# Patient Record
Sex: FEMALE | Race: WHITE | NOT HISPANIC OR LATINO | Employment: OTHER | URBAN - METROPOLITAN AREA
[De-identification: names, ages, dates, MRNs, and addresses within clinical notes are randomized per-mention and may not be internally consistent; named-entity substitution may affect disease eponyms.]

---

## 2020-01-24 ENCOUNTER — HOSPITAL ENCOUNTER (EMERGENCY)
Facility: HOSPITAL | Age: 55
Discharge: HOME | End: 2020-01-24
Attending: EMERGENCY MEDICINE
Payer: COMMERCIAL

## 2020-01-24 ENCOUNTER — APPOINTMENT (EMERGENCY)
Dept: RADIOLOGY | Facility: HOSPITAL | Age: 55
End: 2020-01-24
Attending: EMERGENCY MEDICINE
Payer: COMMERCIAL

## 2020-01-24 VITALS
BODY MASS INDEX: 25.27 KG/M2 | SYSTOLIC BLOOD PRESSURE: 128 MMHG | TEMPERATURE: 98.5 F | WEIGHT: 148 LBS | HEART RATE: 82 BPM | OXYGEN SATURATION: 100 % | RESPIRATION RATE: 16 BRPM | DIASTOLIC BLOOD PRESSURE: 73 MMHG | HEIGHT: 64 IN

## 2020-01-24 DIAGNOSIS — R51.9 NONINTRACTABLE HEADACHE, UNSPECIFIED CHRONICITY PATTERN, UNSPECIFIED HEADACHE TYPE: Primary | ICD-10-CM

## 2020-01-24 DIAGNOSIS — E86.0 DEHYDRATION: ICD-10-CM

## 2020-01-24 DIAGNOSIS — R42 DIZZINESS: ICD-10-CM

## 2020-01-24 LAB
ANION GAP SERPL CALC-SCNC: 8 MEQ/L (ref 3–15)
ATRIAL RATE: 83
BASOPHILS # BLD: 0.05 K/UL (ref 0.01–0.1)
BASOPHILS NFR BLD: 0.8 %
BUN SERPL-MCNC: 16 MG/DL (ref 8–20)
CALCIUM SERPL-MCNC: 8.5 MG/DL (ref 8.9–10.3)
CHLORIDE SERPL-SCNC: 107 MEQ/L (ref 98–109)
CO2 SERPL-SCNC: 21 MEQ/L (ref 22–32)
CREAT SERPL-MCNC: 0.8 MG/DL
DIFFERENTIAL METHOD BLD: NORMAL
EOSINOPHIL # BLD: 0.11 K/UL (ref 0.04–0.36)
EOSINOPHIL NFR BLD: 1.7 %
ERYTHROCYTE [DISTWIDTH] IN BLOOD BY AUTOMATED COUNT: 12.7 % (ref 11.7–14.4)
GFR SERPL CREATININE-BSD FRML MDRD: >60 ML/MIN/1.73M*2
GLUCOSE SERPL-MCNC: 128 MG/DL (ref 70–99)
HCT VFR BLDCO AUTO: 43.4 %
HGB BLD-MCNC: 14.4 G/DL (ref 11.8–15.7)
IMM GRANULOCYTES # BLD AUTO: 0.03 K/UL (ref 0–0.08)
IMM GRANULOCYTES NFR BLD AUTO: 0.5 %
LYMPHOCYTES # BLD: 2.74 K/UL (ref 1.2–3.5)
LYMPHOCYTES NFR BLD: 42.1 %
MCH RBC QN AUTO: 28.1 PG (ref 28–33.2)
MCHC RBC AUTO-ENTMCNC: 33.2 G/DL (ref 32.2–35.5)
MCV RBC AUTO: 84.8 FL (ref 83–98)
MONOCYTES # BLD: 0.49 K/UL (ref 0.28–0.8)
MONOCYTES NFR BLD: 7.5 %
NEUTROPHILS # BLD: 3.09 K/UL (ref 1.7–7)
NEUTS SEG NFR BLD: 47.4 %
NRBC BLD-RTO: 0 %
P AXIS: 69
PDW BLD AUTO: 10.3 FL (ref 9.4–12.3)
PLATELET # BLD AUTO: 278 K/UL
POTASSIUM SERPL-SCNC: 5.9 MEQ/L (ref 3.6–5.1)
PR INTERVAL: 146
QRS DURATION: 70
QT INTERVAL: 378
QTC CALCULATION(BAZETT): 444
R AXIS: 37
RBC # BLD AUTO: 5.12 M/UL (ref 3.93–5.22)
SODIUM SERPL-SCNC: 136 MEQ/L (ref 136–144)
T WAVE AXIS: 48
TROPONIN I SERPL-MCNC: <0.02 NG/ML
VENTRICULAR RATE: 83
WBC # BLD AUTO: 6.51 K/UL

## 2020-01-24 PROCEDURE — 25800000 HC PHARMACY IV SOLUTIONS: Performed by: EMERGENCY MEDICINE

## 2020-01-24 PROCEDURE — 99284 EMERGENCY DEPT VISIT MOD MDM: CPT | Mod: 25

## 2020-01-24 PROCEDURE — 96360 HYDRATION IV INFUSION INIT: CPT

## 2020-01-24 PROCEDURE — 85025 COMPLETE CBC W/AUTO DIFF WBC: CPT

## 2020-01-24 PROCEDURE — 3E0337Z INTRODUCTION OF ELECTROLYTIC AND WATER BALANCE SUBSTANCE INTO PERIPHERAL VEIN, PERCUTANEOUS APPROACH: ICD-10-PCS | Performed by: EMERGENCY MEDICINE

## 2020-01-24 PROCEDURE — 80048 BASIC METABOLIC PNL TOTAL CA: CPT | Performed by: EMERGENCY MEDICINE

## 2020-01-24 PROCEDURE — 85025 COMPLETE CBC W/AUTO DIFF WBC: CPT | Performed by: EMERGENCY MEDICINE

## 2020-01-24 PROCEDURE — 80048 BASIC METABOLIC PNL TOTAL CA: CPT

## 2020-01-24 PROCEDURE — 36415 COLL VENOUS BLD VENIPUNCTURE: CPT

## 2020-01-24 PROCEDURE — 84484 ASSAY OF TROPONIN QUANT: CPT | Performed by: EMERGENCY MEDICINE

## 2020-01-24 PROCEDURE — 93005 ELECTROCARDIOGRAM TRACING: CPT | Performed by: EMERGENCY MEDICINE

## 2020-01-24 PROCEDURE — 93005 ELECTROCARDIOGRAM TRACING: CPT

## 2020-01-24 PROCEDURE — 84484 ASSAY OF TROPONIN QUANT: CPT

## 2020-01-24 PROCEDURE — 71046 X-RAY EXAM CHEST 2 VIEWS: CPT

## 2020-01-24 RX ADMIN — SODIUM CHLORIDE 500 ML: 9 INJECTION, SOLUTION INTRAVENOUS at 05:18

## 2020-01-24 SDOH — HEALTH STABILITY: MENTAL HEALTH: HOW OFTEN DO YOU HAVE A DRINK CONTAINING ALCOHOL?: NOT ASKED

## 2020-01-24 ASSESSMENT — ENCOUNTER SYMPTOMS
EYE PAIN: 0
NECK PAIN: 0
RHINORRHEA: 0
DIARRHEA: 0
CHILLS: 0
FEVER: 0
ARTHRALGIAS: 0
HEADACHES: 0
PALPITATIONS: 1
BLOOD IN STOOL: 0
SHORTNESS OF BREATH: 0
NAUSEA: 0
DIAPHORESIS: 1
DIZZINESS: 1
COUGH: 0
VOMITING: 0
WEAKNESS: 0
BACK PAIN: 0
CONFUSION: 0
LIGHT-HEADEDNESS: 0
DIFFICULTY URINATING: 0

## 2020-01-24 NOTE — ED PROVIDER NOTES
HPI     Chief Complaint   Patient presents with   • Palpitations       54-year-old female presents emergency department complaining of headache, dizziness, sweating.  Patient states that she has had numerous episodes of these symptoms, in clusters over the past 2 years.  She was seen and evaluated in the emergency department in the past was also follow-up with her primary and had a full cardiology work-up at that time.  Patient states that tonight she woke from sleep and had a headache and a dizzy sensation.  Patient also states that she became sweating of palms felt sweaty.  She had a family member check her blood pressure at that time.  Patient was still feeling symptomatic so she came to the emergency department for further evaluation treatment.  At time my evaluation she still complains of a slight ache at the back of her head/top of her neck and feeling very slightly dizzy.  Patient does report that she had a recent URI was treated with oral antibiotics by her primary.  She was also taking over-the-counter decongestants for symptom control.  Patient is otherwise been in her usual state of health.           Patient History     Past Medical History:   Diagnosis Date   • Lipid disorder        Past Surgical History:   Procedure Laterality Date   •  SECTION         History reviewed. No pertinent family history.    Social History     Tobacco Use   • Smoking status: Never Smoker   • Smokeless tobacco: Never Used   Substance Use Topics   • Alcohol use: Yes     Comment: Rare   • Drug use: Never       Systems Reviewed from Nursing Triage:          Review of Systems     Review of Systems   Constitutional: Positive for diaphoresis. Negative for chills and fever.   HENT: Negative for congestion, rhinorrhea and sneezing.    Eyes: Negative for pain.   Respiratory: Negative for cough and shortness of breath.    Cardiovascular: Positive for palpitations. Negative for chest pain.   Gastrointestinal: Negative for blood in  "stool, diarrhea, nausea and vomiting.   Genitourinary: Negative for difficulty urinating.   Musculoskeletal: Negative for arthralgias, back pain and neck pain.   Skin: Negative for rash.   Neurological: Positive for dizziness. Negative for weakness, light-headedness and headaches.   Psychiatric/Behavioral: Negative for confusion.        Physical Exam     ED Triage Vitals [01/24/20 0350]   Temp Heart Rate Resp BP SpO2   36.9 °C (98.5 °F) 83 13 (!) 148/85 100 %      Temp Source Heart Rate Source Patient Position BP Location FiO2 (%) (Set)   Oral Monitor Sitting Right upper arm --                     Patient Vitals for the past 24 hrs:   BP Temp Temp src Pulse Resp SpO2 Height Weight   01/24/20 0606 128/73 -- -- 82 16 100 % -- --   01/24/20 0447 127/67 -- -- 80 16 99 % -- --   01/24/20 0408 -- -- -- 83 -- -- -- --   01/24/20 0350 (!) 148/85 36.9 °C (98.5 °F) Oral 83 13 100 % 1.626 m (5' 4\") 67.1 kg (148 lb)                                       Physical Exam   Constitutional: She is oriented to person, place, and time. She appears well-developed and well-nourished.   HENT:   Head: Normocephalic.   Nose: Nose normal.   Eyes: Pupils are equal, round, and reactive to light. Conjunctivae and EOM are normal.   Neck: Normal range of motion. Neck supple.   Cardiovascular: Normal rate, regular rhythm, normal heart sounds and intact distal pulses.   Pulmonary/Chest: Effort normal and breath sounds normal.   Abdominal: Soft. Bowel sounds are normal.   Musculoskeletal: Normal range of motion. She exhibits no edema.   Lymphadenopathy:     She has no cervical adenopathy.   Neurological: She is alert and oriented to person, place, and time. She has normal strength. No sensory deficit.   Skin: Skin is warm and dry.   Psychiatric: She has a normal mood and affect. Her behavior is normal.   Nursing note and vitals reviewed.           Procedures    ED Course & MDM     Labs Reviewed   BASIC METABOLIC PANEL - Abnormal       Result Value "    Sodium 136      Potassium 5.9 (*)     Chloride 107      CO2 21 (*)     BUN 16      Creatinine 0.8      Glucose 128 (*)     Calcium 8.5 (*)     eGFR >60.0      Anion Gap 8     TROPONIN I - Normal    Troponin I <0.02     CBC AND DIFF    WBC 6.51      RBC 5.12      Hemoglobin 14.4      Hematocrit 43.4      MCV 84.8      MCH 28.1      MCHC 33.2      RDW 12.7      Platelets 278      MPV 10.3      Differential Type Auto      nRBC 0.0      Immature Granulocytes 0.5      Neutrophils 47.4      Lymphocytes 42.1      Monocytes 7.5      Eosinophils 1.7      Basophils 0.8      Immature Granulocytes, Absolute 0.03      Neutrophils, Absolute 3.09      Lymphocytes, Absolute 2.74      Monocytes, Absolute 0.49      Eosinophils, Absolute 0.11      Basophils, Absolute 0.05         ECG 12 lead    (Results Pending)   X-RAY CHEST 2 VIEWS    (Results Pending)               MDM  Number of Diagnoses or Management Options  Dehydration:   Dizziness:   Nonintractable headache, unspecified chronicity pattern, unspecified headache type:   Diagnosis management comments: 54-year-old female to the emergency department palpitations, dizziness, headache.  Possible causes considered including but not limited to anemia, dehydration,, medication reaction, etc.  Plan to check basic labs and studies.    Labs and studies reviewed.  Patient possibly very slightly dehydrated.  Patient given IV fluids and ambulated throughout the emergency department without difficulty or assistance.  Other labs all reasonably within normal limits.  Plan to discharge patient to home and encourage close outpatient follow-up with her primary care provider.  Patient was also encouraged to return to the emergency department for worsening or other concerns.       Amount and/or Complexity of Data Reviewed  Decide to obtain previous medical records or to obtain history from someone other than the patient: yes             Clinical Impressions as of Jan 24 0642   Nonintractable  headache, unspecified chronicity pattern, unspecified headache type   Dizziness   Dehydration        Mildred Weaver,   01/24/20 0695

## 2020-01-24 NOTE — DISCHARGE INSTRUCTIONS
Follow closely with your regular doctor.  Return to the emergency department for worsening or other concerns.  Drink plenty of fluids and rest.

## 2020-07-01 ENCOUNTER — TELEPHONE (OUTPATIENT)
Dept: INFECTIOUS DISEASES | Facility: HOSPITAL | Age: 55
End: 2020-07-01

## 2020-07-01 ENCOUNTER — CLINICAL SUPPORT (OUTPATIENT)
Dept: OTHER | Facility: CLINIC | Age: 55
End: 2020-07-01
Attending: NURSE PRACTITIONER

## 2020-07-01 DIAGNOSIS — R50.9 FEVER, UNSPECIFIED FEVER CAUSE: ICD-10-CM

## 2020-07-01 DIAGNOSIS — R50.9 FEVER, UNSPECIFIED FEVER CAUSE: Primary | ICD-10-CM

## 2020-07-01 NOTE — PROGRESS NOTES
Patient was processed today at [facility] drive-up clinic.  Pt denies any sort of medical distress today.  After identifying the patient, a nasopharyngeal sample was obtained and placed in viral transport medium.  Pt was given a post-collection education sheet and encouraged to self-isolate until they receive the results.  Pt directed to [facility] website for [facility] Privacy Practices.  Sample sent to the lab noted on the order and requisition.  Pt was without additional questions or concerns and was dispositioned from the testing area to home.

## 2020-07-01 NOTE — TELEPHONE ENCOUNTER
Please schedule this patient for Covid 19 testing.  I have updated the patient's demographic information with the patient's contact information for scheduling.    Patient having symptoms?: yes  If yes, list symptoms:Aches and Headache    If asymptomatic - please provide additional information:/authorization     The provider will be placing the order in Epic: no  If no, provider was directed to fax the order to [phone]: Yes    Ordering provider (First - Last): Rosendo Jurado  Provider Best Callback #: [phone]  Fax number (If provider wants results and does not use In Basket): [phone]     This patient is a [employer] Employee: YES/NO/NA: No  If yes: Hospital/Facility & Unit/Department & Job Title:

## 2020-07-07 LAB — SARS-COV-2 RNA RESP QL NAA+PROBE: NOT DETECTED

## 2022-06-15 ENCOUNTER — OFFICE VISIT (OUTPATIENT)
Dept: URBAN - METROPOLITAN AREA SURGERY CENTER 16 | Facility: SURGERY CENTER | Age: 57
End: 2022-06-15

## 2022-06-21 ENCOUNTER — OFFICE VISIT (OUTPATIENT)
Dept: URBAN - METROPOLITAN AREA SURGERY CENTER 16 | Facility: SURGERY CENTER | Age: 57
End: 2022-06-21
Payer: COMMERCIAL

## 2022-06-21 DIAGNOSIS — Z12.11 COLON CANCER SCREENING: ICD-10-CM

## 2022-06-21 DIAGNOSIS — Z80.0 BROTHER AT YOUNG AGE FAMILY HISTORY OF COLON CANCER: ICD-10-CM

## 2022-06-21 PROCEDURE — G8907 PT DOC NO EVENTS ON DISCHARG: HCPCS | Performed by: INTERNAL MEDICINE

## 2022-06-21 PROCEDURE — G0105 COLORECTAL SCRN; HI RISK IND: HCPCS | Performed by: INTERNAL MEDICINE

## 2023-09-13 ENCOUNTER — HOSPITAL ENCOUNTER (EMERGENCY)
Facility: HOSPITAL | Age: 58
Discharge: HOME | End: 2023-09-13
Attending: EMERGENCY MEDICINE | Admitting: EMERGENCY MEDICINE
Payer: COMMERCIAL

## 2023-09-13 ENCOUNTER — APPOINTMENT (EMERGENCY)
Dept: RADIOLOGY | Facility: HOSPITAL | Age: 58
End: 2023-09-13
Attending: EMERGENCY MEDICINE
Payer: COMMERCIAL

## 2023-09-13 VITALS
SYSTOLIC BLOOD PRESSURE: 125 MMHG | BODY MASS INDEX: 25.78 KG/M2 | DIASTOLIC BLOOD PRESSURE: 72 MMHG | HEIGHT: 64 IN | HEART RATE: 75 BPM | RESPIRATION RATE: 18 BRPM | OXYGEN SATURATION: 96 % | TEMPERATURE: 98.6 F | WEIGHT: 151 LBS

## 2023-09-13 DIAGNOSIS — R00.2 PALPITATIONS: Primary | ICD-10-CM

## 2023-09-13 LAB
ALBUMIN SERPL-MCNC: 4.1 G/DL (ref 3.5–5.7)
ALP SERPL-CCNC: 54 IU/L (ref 34–125)
ALT SERPL-CCNC: 19 IU/L (ref 7–52)
ANION GAP SERPL CALC-SCNC: 8 MEQ/L (ref 3–15)
AST SERPL-CCNC: 22 IU/L (ref 13–39)
ATRIAL RATE: 86
BASOPHILS # BLD: 0.04 K/UL (ref 0.01–0.1)
BASOPHILS NFR BLD: 0.7 %
BILIRUB SERPL-MCNC: 0.5 MG/DL (ref 0.3–1.2)
BNP SERPL-MCNC: 19 PG/ML
BUN SERPL-MCNC: 13 MG/DL (ref 7–25)
CALCIUM SERPL-MCNC: 9.2 MG/DL (ref 8.6–10.3)
CHLORIDE SERPL-SCNC: 107 MEQ/L (ref 98–107)
CO2 SERPL-SCNC: 26 MEQ/L (ref 21–31)
CREAT SERPL-MCNC: 0.9 MG/DL (ref 0.6–1.2)
D DIMER PPP IA.FEU-MCNC: 0.31 UG/ML FEU (ref 0–0.5)
DIFFERENTIAL METHOD BLD: NORMAL
EOSINOPHIL # BLD: 0.08 K/UL (ref 0.04–0.36)
EOSINOPHIL NFR BLD: 1.3 %
ERYTHROCYTE [DISTWIDTH] IN BLOOD BY AUTOMATED COUNT: 12.4 % (ref 11.7–14.4)
GFR SERPL CREATININE-BSD FRML MDRD: >60 ML/MIN/1.73M*2
GLUCOSE SERPL-MCNC: 117 MG/DL (ref 70–99)
HCT VFR BLDCO AUTO: 42.2 % (ref 35–45)
HGB BLD-MCNC: 14.1 G/DL (ref 11.8–15.7)
IMM GRANULOCYTES # BLD AUTO: 0.01 K/UL (ref 0–0.08)
IMM GRANULOCYTES NFR BLD AUTO: 0.2 %
LYMPHOCYTES # BLD: 2.71 K/UL (ref 1.2–3.5)
LYMPHOCYTES NFR BLD: 45 %
MCH RBC QN AUTO: 28.3 PG (ref 28–33.2)
MCHC RBC AUTO-ENTMCNC: 33.4 G/DL (ref 32.2–35.5)
MCV RBC AUTO: 84.7 FL (ref 83–98)
MONOCYTES # BLD: 0.4 K/UL (ref 0.28–0.8)
MONOCYTES NFR BLD: 6.6 %
NEUTROPHILS # BLD: 2.78 K/UL (ref 1.7–7)
NEUTS SEG NFR BLD: 46.2 %
NRBC BLD-RTO: 0 %
P AXIS: 70
PDW BLD AUTO: 10.2 FL (ref 9.4–12.3)
PLATELET # BLD AUTO: 264 K/UL (ref 150–369)
POTASSIUM SERPL-SCNC: 3.5 MEQ/L (ref 3.5–5.1)
PR INTERVAL: 150
PROT SERPL-MCNC: 6.8 G/DL (ref 6–8.2)
QRS DURATION: 82
QT INTERVAL: 382
QTC CALCULATION(BAZETT): 457
R AXIS: 41
RBC # BLD AUTO: 4.98 M/UL (ref 3.93–5.22)
SODIUM SERPL-SCNC: 141 MEQ/L (ref 136–145)
T WAVE AXIS: 38
TROPONIN I SERPL HS-MCNC: 2.5 PG/ML
TROPONIN I SERPL HS-MCNC: 3.6 PG/ML
TSH SERPL DL<=0.05 MIU/L-ACNC: 3.71 MIU/L (ref 0.34–5.6)
VENTRICULAR RATE: 86
WBC # BLD AUTO: 6.02 K/UL (ref 3.8–10.5)

## 2023-09-13 PROCEDURE — 85025 COMPLETE CBC W/AUTO DIFF WBC: CPT | Performed by: EMERGENCY MEDICINE

## 2023-09-13 PROCEDURE — 84484 ASSAY OF TROPONIN QUANT: CPT | Performed by: EMERGENCY MEDICINE

## 2023-09-13 PROCEDURE — 36415 COLL VENOUS BLD VENIPUNCTURE: CPT | Performed by: EMERGENCY MEDICINE

## 2023-09-13 PROCEDURE — 85379 FIBRIN DEGRADATION QUANT: CPT | Performed by: EMERGENCY MEDICINE

## 2023-09-13 PROCEDURE — 84443 ASSAY THYROID STIM HORMONE: CPT | Performed by: EMERGENCY MEDICINE

## 2023-09-13 PROCEDURE — 84484 ASSAY OF TROPONIN QUANT: CPT | Mod: 91 | Performed by: EMERGENCY MEDICINE

## 2023-09-13 PROCEDURE — 93010 ELECTROCARDIOGRAM REPORT: CPT | Performed by: INTERNAL MEDICINE

## 2023-09-13 PROCEDURE — 93005 ELECTROCARDIOGRAM TRACING: CPT

## 2023-09-13 PROCEDURE — 71045 X-RAY EXAM CHEST 1 VIEW: CPT

## 2023-09-13 PROCEDURE — 99283 EMERGENCY DEPT VISIT LOW MDM: CPT | Mod: 25

## 2023-09-13 PROCEDURE — 93005 ELECTROCARDIOGRAM TRACING: CPT | Performed by: EMERGENCY MEDICINE

## 2023-09-13 PROCEDURE — 80053 COMPREHEN METABOLIC PANEL: CPT | Performed by: EMERGENCY MEDICINE

## 2023-09-13 PROCEDURE — 83880 ASSAY OF NATRIURETIC PEPTIDE: CPT | Performed by: EMERGENCY MEDICINE

## 2023-09-13 ASSESSMENT — ENCOUNTER SYMPTOMS
NECK PAIN: 0
HEADACHES: 0
EYE DISCHARGE: 0
ABDOMINAL PAIN: 0
SHORTNESS OF BREATH: 0
SORE THROAT: 0
FEVER: 0
CHILLS: 0
COUGH: 0
FACIAL SWELLING: 0
BACK PAIN: 0
WEAKNESS: 0
VOMITING: 0
NAUSEA: 0
EYE REDNESS: 0
PALPITATIONS: 1

## 2023-09-13 NOTE — Clinical Note
Shaniqua Pham was seen and treated in our emergency department on 9/13/2023.  Shaniqua Pham may return to work on 09/15/2023.       If you have any questions or concerns, please don't hesitate to call.      Justin Jaquez, DO

## 2023-09-13 NOTE — ED PROVIDER NOTES
"Emergency Medicine Note  HPI   HISTORY OF PRESENT ILLNESS     HPI patient is a 58-year-old female past medical history of hyperlipidemia presents with several days of palpitations and a \"thumping\" sensation in her chest.  Patient states that she recently returned from a hiking trip in [place] denies any tick bites or fevers.  Patient states she tested negative for COVID.      Patient History   PAST HISTORY     Reviewed from Nursing Triage:       Past Medical History:   Diagnosis Date    Lipid disorder        Past Surgical History:   Procedure Laterality Date     SECTION         History reviewed. No pertinent family history.    Social History     Tobacco Use    Smoking status: Never    Smokeless tobacco: Never   Substance Use Topics    Alcohol use: Yes     Comment: Rare    Drug use: Yes     Comment: CBD gummie         Review of Systems   REVIEW OF SYSTEMS     Review of Systems   Constitutional: Negative for chills and fever.   HENT: Negative for facial swelling and sore throat.    Eyes: Negative for discharge and redness.   Respiratory: Negative for cough and shortness of breath.    Cardiovascular: Positive for palpitations. Negative for leg swelling.   Gastrointestinal: Negative for abdominal pain, nausea and vomiting.   Musculoskeletal: Negative for back pain and neck pain.   Skin: Negative for pallor.   Neurological: Negative for syncope, weakness and headaches.         VITALS     ED Vitals    Date/Time Temp Pulse Resp BP SpO2 [facility]   23 0226 36.4 °C (97.6 °F) 84 16 174/80 100 % PKF                       Physical Exam   PHYSICAL EXAM     Physical Exam  Vitals and nursing note reviewed.   Constitutional:       General: She is not in acute distress.     Appearance: Normal appearance. She is not ill-appearing, toxic-appearing or diaphoretic.   HENT:      Head: Normocephalic and atraumatic.      Nose: Nose normal.      Mouth/Throat:      Pharynx: Oropharynx is clear. No oropharyngeal exudate or " posterior oropharyngeal erythema.   Eyes:      General:         Right eye: No discharge.         Left eye: No discharge.      Extraocular Movements: Extraocular movements intact.      Pupils: Pupils are equal, round, and reactive to light.   Cardiovascular:      Rate and Rhythm: Normal rate and regular rhythm.      Pulses: Normal pulses.      Heart sounds: Normal heart sounds.   Pulmonary:      Effort: Pulmonary effort is normal.      Breath sounds: Normal breath sounds.   Abdominal:      Palpations: Abdomen is soft.      Tenderness: There is no abdominal tenderness.   Musculoskeletal:         General: No swelling, tenderness, deformity or signs of injury. Normal range of motion.      Cervical back: Normal range of motion. No tenderness.      Right lower leg: No edema.      Left lower leg: No edema.   Skin:     General: Skin is warm and dry.   Neurological:      Mental Status: She is alert and oriented to person, place, and time.      Cranial Nerves: No cranial nerve deficit.      Sensory: No sensory deficit.      Motor: No weakness.      Gait: Gait normal.           PROCEDURES     Procedures     DATA     Results     Procedure Component Value Units Date/Time    HS Troponin I (with 2 hour reflex) [[id number]]  (Normal) Collected: 09/13/23 0258    Specimen: Blood, Venous Updated: 09/13/23 0346     High Sens Troponin I 2.5 pg/mL     B-type natriuretic peptide [[id number]]  (Normal) Collected: 09/13/23 0258    Specimen: Blood, Venous Updated: 09/13/23 0341     BNP 19 pg/mL     Comprehensive metabolic panel [[id number]]  (Abnormal) Collected: 09/13/23 0258    Specimen: Blood, Venous Updated: 09/13/23 0340     Sodium 141 mEQ/L      Potassium 3.5 mEQ/L      Comment: Results obtained on plasma. Plasma Potassium values may be up to 0.4 mEQ/L less than serum values. The differences may be greater for patients with high platelet or white cell counts.        Chloride 107 mEQ/L      CO2 26 mEQ/L      BUN 13 mg/dL      Creatinine 0.9  mg/dL      Glucose 117 mg/dL      Calcium 9.2 mg/dL      AST (SGOT) 22 IU/L      ALT (SGPT) 19 IU/L      Alkaline Phosphatase 54 IU/L      Total Protein 6.8 g/dL      Comment: Test performed on plasma which typically contains approximately 0.4 g/dL more protein than serum.        Albumin 4.1 g/dL      Bilirubin, Total 0.5 mg/dL      eGFR >60.0 mL/min/1.73m*2      Anion Gap 8 mEQ/L     D-dimer, quantitative [[id number]]  (Normal) Collected: 09/13/23 0258    Specimen: Blood, Venous Updated: 09/13/23 0323     D-Dimer, Quant 0.31 ug/mL FEU      Comment: Suggested Age Related Reference Range: 0.00 - 0.58  The D-Dimer assay can be used as an aid in the diagnosis of DVT or PE. The test can not be used by itself to exclude DVT or PE. When used as a diagnostic aid, the cutoff value is the same as the reference range: <0.5 ug/ml FEU.       CBC and differential [[id number]] Collected: 09/13/23 0258    Specimen: Blood, Venous Updated: 09/13/23 0310     WBC 6.02 K/uL      RBC 4.98 M/uL      Hemoglobin 14.1 g/dL      Hematocrit 42.2 %      MCV 84.7 fL      MCH 28.3 pg      MCHC 33.4 g/dL      RDW 12.4 %      Platelets 264 K/uL      MPV 10.2 fL      Differential Type Auto     nRBC 0.0 %      Immature Granulocytes 0.2 %      Neutrophils 46.2 %      Lymphocytes 45.0 %      Monocytes 6.6 %      Eosinophils 1.3 %      Basophils 0.7 %      Immature Granulocytes, Absolute 0.01 K/uL      Neutrophils, Absolute 2.78 K/uL      Lymphocytes, Absolute 2.71 K/uL      Monocytes, Absolute 0.40 K/uL      Eosinophils, Absolute 0.08 K/uL      Basophils, Absolute 0.04 K/uL     TSH w reflex FT4 [[id number]] Collected: 09/13/23 0258    Specimen: Blood, Venous Updated: 09/13/23 0302          Imaging Results          X-RAY CHEST 1 VIEW (Preliminary result)  Result time 09/13/23 03:45:43    Preliminary Interpretation    nad                              ECG 12 lead    (Results Pending)       Scoring tools                                  ED Course & MDM   MDM /  ED COURSE / CLINICAL IMPRESSION / DISPO     Medical Decision Making  Patient is a 58-year-old female presents with several days of palpitation and a thumping sensation in her chest.  Patient vital signs are stable.  Patient took her blood pressure at home and found to be slightly elevated.  While in the ED patient's blood pressure has come down to 142/78 without any intervention.  Patient's EKG shows no signs of ischemia.  Patient's chest x-ray is unremarkable.  Patient's troponin D-dimer BMP all within normal limits.  I do not believe patient is experiencing any dysrhythmia or acute coronary syndrome/thromboembolic disease.  Patient also admits to taking marijuana edibles tonight.    Amount and/or Complexity of Data Reviewed  Labs: ordered.  Radiology: ordered and independent interpretation performed.  ECG/medicine tests: ordered.          ED Course as of 09/13/23 0349   Wed Sep 13, 2023   0259 EKG normal sinus rhythm rate of 86 normal QRS normal QTc interval no significant ST elevations or depressions [TASHA]      ED Course User Index  [TASHA] Justin Jaquez DO     Clinical Impression      None               Justin Jaquez DO  09/16/23 1923

## 2023-09-13 NOTE — DISCHARGE INSTRUCTIONS
Return to the emergency room if any chest pain shortness of breath nausea vomiting.  Please keep your appointment with the cardiologist in November.  Will also need to follow-up with family doctor in several days to have a repeat blood pressure to determine whether or not you do have hypertension.

## 2023-10-12 ENCOUNTER — TELEPHONE ENCOUNTER (OUTPATIENT)
Dept: URBAN - METROPOLITAN AREA CLINIC 63 | Facility: CLINIC | Age: 58
End: 2023-10-12